# Patient Record
Sex: FEMALE | Race: WHITE | NOT HISPANIC OR LATINO | URBAN - METROPOLITAN AREA
[De-identification: names, ages, dates, MRNs, and addresses within clinical notes are randomized per-mention and may not be internally consistent; named-entity substitution may affect disease eponyms.]

---

## 2020-12-16 LAB
EXTERNAL HEPATITIS B SURFACE ANTIGEN: NEGATIVE
EXTERNAL RUBELLA QUALITATIVE: NORMAL
EXTERNAL SYPHILIS RPR SCREEN: NORMAL
HIV1 P24 AG SERPL QL IA: NEGATIVE

## 2021-03-30 ENCOUNTER — IMMUNIZATION (OUTPATIENT)
Dept: VACCINE CLINIC | Facility: HOSPITAL | Age: 31
End: 2021-03-30

## 2021-03-30 PROCEDURE — 0001A: CPT | Performed by: INTERNAL MEDICINE

## 2021-03-30 PROCEDURE — 91300 HC SARSCOV02 VAC 30MCG/0.3ML IM: CPT | Performed by: INTERNAL MEDICINE

## 2021-04-20 ENCOUNTER — IMMUNIZATION (OUTPATIENT)
Dept: VACCINE CLINIC | Facility: HOSPITAL | Age: 31
End: 2021-04-20

## 2021-04-20 PROCEDURE — 0002A: CPT | Performed by: INTERNAL MEDICINE

## 2021-04-20 PROCEDURE — 91300 HC SARSCOV02 VAC 30MCG/0.3ML IM: CPT | Performed by: INTERNAL MEDICINE

## 2021-06-22 LAB — EXTERNAL GROUP B STREP ANTIGEN: NORMAL

## 2021-07-08 ENCOUNTER — HOSPITAL ENCOUNTER (INPATIENT)
Facility: HOSPITAL | Age: 31
LOS: 2 days | Discharge: HOME | End: 2021-07-10
Attending: OBSTETRICS & GYNECOLOGY | Admitting: OBSTETRICS & GYNECOLOGY

## 2021-07-08 PROBLEM — Z34.90 PREGNANCY: Status: RESOLVED | Noted: 2021-07-08 | Resolved: 2021-07-08

## 2021-07-08 PROBLEM — Z34.90 PREGNANCY: Status: ACTIVE | Noted: 2021-07-08

## 2021-07-08 LAB
ABO GROUP BLD: NORMAL
BASOPHILS # BLD AUTO: 0.03 10*3/MM3 (ref 0–0.2)
BASOPHILS NFR BLD AUTO: 0.2 % (ref 0–1.5)
BLD GP AB SCN SERPL QL: NEGATIVE
DEPRECATED RDW RBC AUTO: 41.3 FL (ref 37–54)
EOSINOPHIL # BLD AUTO: 0.01 10*3/MM3 (ref 0–0.4)
EOSINOPHIL NFR BLD AUTO: 0.1 % (ref 0.3–6.2)
ERYTHROCYTE [DISTWIDTH] IN BLOOD BY AUTOMATED COUNT: 13.2 % (ref 12.3–15.4)
HCT VFR BLD AUTO: 39.2 % (ref 34–46.6)
HGB BLD-MCNC: 13.4 G/DL (ref 12–15.9)
IMM GRANULOCYTES # BLD AUTO: 0.09 10*3/MM3 (ref 0–0.05)
IMM GRANULOCYTES NFR BLD AUTO: 0.5 % (ref 0–0.5)
LYMPHOCYTES # BLD AUTO: 0.86 10*3/MM3 (ref 0.7–3.1)
LYMPHOCYTES NFR BLD AUTO: 5 % (ref 19.6–45.3)
MCH RBC QN AUTO: 29.2 PG (ref 26.6–33)
MCHC RBC AUTO-ENTMCNC: 34.2 G/DL (ref 31.5–35.7)
MCV RBC AUTO: 85.4 FL (ref 79–97)
MONOCYTES # BLD AUTO: 0.68 10*3/MM3 (ref 0.1–0.9)
MONOCYTES NFR BLD AUTO: 3.9 % (ref 5–12)
NEUTROPHILS NFR BLD AUTO: 15.59 10*3/MM3 (ref 1.7–7)
NEUTROPHILS NFR BLD AUTO: 90.3 % (ref 42.7–76)
NRBC BLD AUTO-RTO: 0 /100 WBC (ref 0–0.2)
PLATELET # BLD AUTO: 157 10*3/MM3 (ref 140–450)
PMV BLD AUTO: 11 FL (ref 6–12)
RBC # BLD AUTO: 4.59 10*6/MM3 (ref 3.77–5.28)
RH BLD: POSITIVE
SARS-COV-2 RNA PNL SPEC NAA+PROBE: NOT DETECTED
T&S EXPIRATION DATE: NORMAL
WBC # BLD AUTO: 17.26 10*3/MM3 (ref 3.4–10.8)

## 2021-07-08 PROCEDURE — 86901 BLOOD TYPING SEROLOGIC RH(D): CPT | Performed by: OBSTETRICS & GYNECOLOGY

## 2021-07-08 PROCEDURE — 86900 BLOOD TYPING SEROLOGIC ABO: CPT | Performed by: OBSTETRICS & GYNECOLOGY

## 2021-07-08 PROCEDURE — 86850 RBC ANTIBODY SCREEN: CPT | Performed by: OBSTETRICS & GYNECOLOGY

## 2021-07-08 PROCEDURE — 85025 COMPLETE CBC W/AUTO DIFF WBC: CPT | Performed by: OBSTETRICS & GYNECOLOGY

## 2021-07-08 PROCEDURE — 0KQM0ZZ REPAIR PERINEUM MUSCLE, OPEN APPROACH: ICD-10-PCS | Performed by: OBSTETRICS & GYNECOLOGY

## 2021-07-08 PROCEDURE — 87635 SARS-COV-2 COVID-19 AMP PRB: CPT | Performed by: OBSTETRICS & GYNECOLOGY

## 2021-07-08 RX ORDER — BISACODYL 10 MG
10 SUPPOSITORY, RECTAL RECTAL DAILY PRN
Status: DISCONTINUED | OUTPATIENT
Start: 2021-07-09 | End: 2021-07-10 | Stop reason: HOSPADM

## 2021-07-08 RX ORDER — OXYTOCIN-SODIUM CHLORIDE 0.9% IV SOLN 30 UNIT/500ML 30-0.9/5 UT/ML-%
250 SOLUTION INTRAVENOUS CONTINUOUS PRN
Status: ACTIVE | OUTPATIENT
Start: 2021-07-08 | End: 2021-07-08

## 2021-07-08 RX ORDER — MISOPROSTOL 200 UG/1
600 TABLET ORAL ONCE AS NEEDED
Status: DISCONTINUED | OUTPATIENT
Start: 2021-07-08 | End: 2021-07-10 | Stop reason: HOSPADM

## 2021-07-08 RX ORDER — MAGNESIUM CARB/ALUMINUM HYDROX 105-160MG
30 TABLET,CHEWABLE ORAL ONCE
Status: COMPLETED | OUTPATIENT
Start: 2021-07-08 | End: 2021-07-08

## 2021-07-08 RX ORDER — SODIUM CHLORIDE, SODIUM LACTATE, POTASSIUM CHLORIDE, CALCIUM CHLORIDE 600; 310; 30; 20 MG/100ML; MG/100ML; MG/100ML; MG/100ML
125 INJECTION, SOLUTION INTRAVENOUS CONTINUOUS
Status: DISCONTINUED | OUTPATIENT
Start: 2021-07-08 | End: 2021-07-08

## 2021-07-08 RX ORDER — CALCIUM CARBONATE 200(500)MG
2 TABLET,CHEWABLE ORAL 3 TIMES DAILY PRN
Status: DISCONTINUED | OUTPATIENT
Start: 2021-07-08 | End: 2021-07-10 | Stop reason: HOSPADM

## 2021-07-08 RX ORDER — ONDANSETRON 4 MG/1
4 TABLET, FILM COATED ORAL EVERY 8 HOURS PRN
Status: DISCONTINUED | OUTPATIENT
Start: 2021-07-08 | End: 2021-07-10 | Stop reason: HOSPADM

## 2021-07-08 RX ORDER — OXYTOCIN-SODIUM CHLORIDE 0.9% IV SOLN 30 UNIT/500ML 30-0.9/5 UT/ML-%
125 SOLUTION INTRAVENOUS CONTINUOUS PRN
Status: COMPLETED | OUTPATIENT
Start: 2021-07-08 | End: 2021-07-08

## 2021-07-08 RX ORDER — CARBOPROST TROMETHAMINE 250 UG/ML
250 INJECTION, SOLUTION INTRAMUSCULAR AS NEEDED
Status: DISCONTINUED | OUTPATIENT
Start: 2021-07-08 | End: 2021-07-08 | Stop reason: HOSPADM

## 2021-07-08 RX ORDER — PROMETHAZINE HYDROCHLORIDE 25 MG/1
25 TABLET ORAL EVERY 6 HOURS PRN
Status: DISCONTINUED | OUTPATIENT
Start: 2021-07-08 | End: 2021-07-10 | Stop reason: HOSPADM

## 2021-07-08 RX ORDER — HYDROCORTISONE 25 MG/G
1 CREAM TOPICAL AS NEEDED
Status: DISCONTINUED | OUTPATIENT
Start: 2021-07-08 | End: 2021-07-10 | Stop reason: HOSPADM

## 2021-07-08 RX ORDER — METHYLERGONOVINE MALEATE 0.2 MG/ML
200 INJECTION INTRAVENOUS ONCE AS NEEDED
Status: DISCONTINUED | OUTPATIENT
Start: 2021-07-08 | End: 2021-07-08 | Stop reason: HOSPADM

## 2021-07-08 RX ORDER — IBUPROFEN 800 MG/1
800 TABLET ORAL EVERY 8 HOURS PRN
Status: DISCONTINUED | OUTPATIENT
Start: 2021-07-08 | End: 2021-07-10 | Stop reason: HOSPADM

## 2021-07-08 RX ORDER — OXYCODONE AND ACETAMINOPHEN 10; 325 MG/1; MG/1
1 TABLET ORAL EVERY 4 HOURS PRN
Status: DISCONTINUED | OUTPATIENT
Start: 2021-07-08 | End: 2021-07-10 | Stop reason: HOSPADM

## 2021-07-08 RX ORDER — ASPIRIN 81 MG/1
81 TABLET, CHEWABLE ORAL DAILY
COMMUNITY
End: 2021-07-10 | Stop reason: HOSPADM

## 2021-07-08 RX ORDER — OXYCODONE HYDROCHLORIDE AND ACETAMINOPHEN 5; 325 MG/1; MG/1
1 TABLET ORAL EVERY 4 HOURS PRN
Status: DISCONTINUED | OUTPATIENT
Start: 2021-07-08 | End: 2021-07-10 | Stop reason: HOSPADM

## 2021-07-08 RX ORDER — LANOLIN
CREAM (ML) TOPICAL
Status: DISCONTINUED | OUTPATIENT
Start: 2021-07-08 | End: 2021-07-10 | Stop reason: HOSPADM

## 2021-07-08 RX ORDER — PHYTONADIONE 1 MG/.5ML
INJECTION, EMULSION INTRAMUSCULAR; INTRAVENOUS; SUBCUTANEOUS
Status: DISPENSED
Start: 2021-07-08 | End: 2021-07-09

## 2021-07-08 RX ORDER — PROMETHAZINE HYDROCHLORIDE 12.5 MG/1
12.5 SUPPOSITORY RECTAL EVERY 6 HOURS PRN
Status: DISCONTINUED | OUTPATIENT
Start: 2021-07-08 | End: 2021-07-10 | Stop reason: HOSPADM

## 2021-07-08 RX ORDER — ONDANSETRON 2 MG/ML
4 INJECTION INTRAMUSCULAR; INTRAVENOUS EVERY 6 HOURS PRN
Status: DISCONTINUED | OUTPATIENT
Start: 2021-07-08 | End: 2021-07-10 | Stop reason: HOSPADM

## 2021-07-08 RX ORDER — HYDROXYZINE 50 MG/1
50 TABLET, FILM COATED ORAL NIGHTLY PRN
Status: DISCONTINUED | OUTPATIENT
Start: 2021-07-08 | End: 2021-07-10 | Stop reason: HOSPADM

## 2021-07-08 RX ORDER — SODIUM CHLORIDE 0.9 % (FLUSH) 0.9 %
10 SYRINGE (ML) INJECTION AS NEEDED
Status: DISCONTINUED | OUTPATIENT
Start: 2021-07-08 | End: 2021-07-08

## 2021-07-08 RX ORDER — SODIUM CHLORIDE 0.9 % (FLUSH) 0.9 %
10 SYRINGE (ML) INJECTION EVERY 12 HOURS SCHEDULED
Status: DISCONTINUED | OUTPATIENT
Start: 2021-07-08 | End: 2021-07-08

## 2021-07-08 RX ORDER — DOCUSATE SODIUM 100 MG/1
100 CAPSULE, LIQUID FILLED ORAL 2 TIMES DAILY
Status: DISCONTINUED | OUTPATIENT
Start: 2021-07-08 | End: 2021-07-10 | Stop reason: HOSPADM

## 2021-07-08 RX ORDER — MISOPROSTOL 200 UG/1
800 TABLET ORAL AS NEEDED
Status: DISCONTINUED | OUTPATIENT
Start: 2021-07-08 | End: 2021-07-08 | Stop reason: HOSPADM

## 2021-07-08 RX ORDER — ERYTHROMYCIN 5 MG/G
OINTMENT OPHTHALMIC
Status: DISPENSED
Start: 2021-07-08 | End: 2021-07-09

## 2021-07-08 RX ORDER — PRENATAL VIT NO.126/IRON/FOLIC 28MG-0.8MG
1 TABLET ORAL DAILY
COMMUNITY

## 2021-07-08 RX ORDER — OXYTOCIN-SODIUM CHLORIDE 0.9% IV SOLN 30 UNIT/500ML 30-0.9/5 UT/ML-%
999 SOLUTION INTRAVENOUS ONCE
Status: COMPLETED | OUTPATIENT
Start: 2021-07-08 | End: 2021-07-08

## 2021-07-08 RX ADMIN — Medication: at 16:53

## 2021-07-08 RX ADMIN — SODIUM CHLORIDE, POTASSIUM CHLORIDE, SODIUM LACTATE AND CALCIUM CHLORIDE 999 ML/HR: 600; 310; 30; 20 INJECTION, SOLUTION INTRAVENOUS at 13:15

## 2021-07-08 RX ADMIN — IBUPROFEN 800 MG: 800 TABLET, FILM COATED ORAL at 16:53

## 2021-07-08 RX ADMIN — OXYTOCIN 125 ML/HR: 10 INJECTION INTRAVENOUS at 13:58

## 2021-07-08 RX ADMIN — MINERAL OIL 30 ML: 1000 SOLUTION ORAL at 12:30

## 2021-07-08 RX ADMIN — OXYTOCIN 999 ML/HR: 10 INJECTION INTRAVENOUS at 12:37

## 2021-07-08 NOTE — L&D DELIVERY NOTE
"[facility]  Vaginal Delivery Note    Patient Name: Luz Maria Ospina  :  1990  MRN:  [id number]      Delivery     Delivery: Vaginal, Spontaneous     YOB: 2020    Time of Birth: 12:33 PM      Anesthesia: None     Delivering clinician: Kellen Talbot MD       Infant    Findings: Viable female  infant    Infant observations: Weight: 3245 g (7 lb 2.5 oz)   Observations/Comments:  scale 4      Apgars: 8  @ 1 minute /    9  @ 5 minutes     Placenta, Cord, and Fluid    Placenta delivered  Spontaneous   at  2021 12:37 PM     Cord: 3 vessels  present.   Cord blood obtained: Yes    Cord gases obtained:  No      Repair    Episiotomy: No   Lacerations: Second midline   Estimated Blood Loss: 400  mls.          Delivery narrative: The patient is a 31 y.o.  at 39w3d.  Presented in active labor after SROM about midnight.  Was 6-7 on admission. Progressed normally to C/C/+2.  Pushed quickly to bring baby to perineum.  Fetal status reassuring throughout.  of viable female infant  @ 12:33 PM  over an intact perineum. ASDE. 3245 g (7 lb 2.5 oz) .  Placenta delivered spontaneously, intact with 3 vessel cord. Cervix and rectum intact. Lidocaine placed for repair.  Second degree laceration repaired in usual fashion with 3-0 monocryl suture. Mother and baby recovering good condition.       Kellen Talbot MD  21  15:00 EDT  Note written at time of delivery- will \"refresh\" later to include baby data.    "

## 2021-07-08 NOTE — LACTATION NOTE
Mom reports baby is latching well so far. Mom had questions regarding pumping at some point to store breast milk. Encouraged mom to call RN for assistance if needed tonight and LC will be back in morning. Mom has LC number. Mom has personal pump  Lactation Consult Note    Evaluation Completed: 2021 18:36 EDT  Patient Name: Luz Maria Ospina  :  1990  MRN:  [id number]     REFERRAL  INFORMATION:                                         DELIVERY HISTORY:        Skin to skin initiation date/time: 2021  12:35 PM   Skin to skin end date/time:           MATERNAL ASSESSMENT:                               INFANT ASSESSMENT:  Information for the patient's :  Brendan Ospina [[id number]]   No past medical history on file.                                                                                                     MATERNAL INFANT FEEDING:                                                                       EQUIPMENT TYPE:                                 BREAST PUMPING:          LACTATION REFERRALS:

## 2021-07-08 NOTE — H&P
[facility]  Obstetric History and Physical    Chief Complaint   Patient presents with   • Laboring     SROM @ 0300. 1 cm in office 21,, GBS- oer ot. Reports good fetal movement       Subjective     Patient is a 31 y.o. female  currently at 39w3d, who presents with ruptured membranes and active labor.    Her prenatal care is benign.  Her previous obstetric/gynecological history is noted for is non-contributory.    The following portions of the patients history were reviewed and updated as appropriate: current medications, allergies, past medical history, past surgical history, past family history, past social history and problem list .       Prenatal Information:  Prenatal Results    The patient does not have a working TAYLOR. Some results will not display without a working TAYLOR.   POC Urine Glucose/Protein     Test Value Reference Range Date Time    Urine Glucose        Urine Protein              Initial Prenatal Labs     Test Value Reference Range Date Time    Hemoglobin        Hematocrit        Platelets        Rubella IgG        Hepatitis B SAg        Hepatitis C Ab        RPR        ABO        Rh        Antibody Screen        HIV        Urine Culture        Gonorrhea        Chlamydia        TSH              2nd and 3rd Trimester     Test Value Reference Range Date Time    Hemoglobin (repeated)        Hematocrit (repeated)        GCT        Antibody Screen (repeated)        GTT Fasting        GTT 1 Hr        GTT 2 Hr        GTT 3 Hr        Group B Strep              Drug Screening     Test Value Reference Range Date Time    Amphetamine Screen        Barbiturate Screen        Benzodiazepine Screen        Methadone Screen        Phencyclidine Screen        Opiates Screen        THC Screen        Cocaine Screen        Propoxyphene Screen        Buprenorphine Screen        Methamphetamine Screen        Oxycodone Screen        Tricyclic Antidepressants Screen              Other (Risk screening)     Test  Value Reference Range Date Time    Varicella IgG        Parvovirus IgG        CMV IgG        Cystic Fibrosis        Hemoglobin electrophoresis        NIPT        MSAFP-4        AFP (for NTD only)              Legend    ^: Historical                           Past OB History:     OB History    Para Term  AB Living   1 1 1 0 0 1   SAB TAB Ectopic Molar Multiple Live Births   0 0 0 0 0 1      # Outcome Date GA Lbr Alberto/2nd Weight Sex Delivery Anes PTL Lv   1 Term 21 39w3d 09:24 / 00:09 3245 g (7 lb 2.5 oz) F Vag-Spont None N CANDELARIA      Birth Comments: scale 4      Name: ANTONIO STEVENS      Apgar1: 8  Apgar5: 9       Past Medical History: History reviewed. No pertinent past medical history.   Past Surgical History Past Surgical History:   Procedure Laterality Date   • WISDOM TOOTH EXTRACTION        Family History: Family History   Problem Relation Age of Onset   • Diabetes Maternal Grandfather       Social History:  reports that she has never smoked. She has never used smokeless tobacco.   has no history on file for alcohol use.   has no history on file for drug use.        General ROS: Pertinent items are noted in HPI    Objective       Vital Signs Range for the last 24 hours  Temperature: Temp:  [97.8 °F (36.6 °C)-98.9 °F (37.2 °C)] 98 °F (36.7 °C)   Temp Source: Temp src: Oral   BP: BP: (117-131)/(65-89) 125/80   Pulse: Heart Rate:  [] 74   Respirations: Resp:  [16-20] 16   SPO2: SpO2:  [100 %] 100 %   O2 Amount (l/min):     O2 Devices     Weight: Weight:  [68 kg (150 lb)] 68 kg (150 lb)     Physical Examination: General appearance - alert, well appearing, and in no distress  Chest - clear to auscultation, no wheezes, rales or rhonchi, symmetric air entry  Heart - normal rate, regular rhythm, normal S1, S2, no murmurs, rubs, clicks or gallops  Abdomen - soft, nontender, nondistended, no masses or organomegaly  Pelvic - normal external genitalia, vulva, vagina, cervix, uterus and  adnexa    Presentation: cephalic   Cervix: Exam by: Method: sterile exam per RN   Dilation: Cervical Dilation (cm): 10   Effacement: Cervical Effacement: 100%   Station:         Fetal Heart Rate Assessment   Method: Fetal HR Assessment Method: external   Beats/min: Fetal HR (beats/min): 145   Baseline: Fetal Heart Baseline Rate: normal range   Variability: Fetal HR Variability: moderate (amplitude range 6 to 25 bpm)   Accels: Fetal HR Accelerations: greater than/equal to 15 bpm, lasting at least 15 seconds   Decels: Fetal HR Decelerations: late   Tracing Category:       Uterine Assessment   Method: Method: palpation, per patient report, external tocotransducer   Frequency (min): Contraction Frequency (Minutes): 2-4   Ctx Count in 10 min:     Duration:     Intensity: Contraction Intensity: moderate by palpation   Intensity by IUPC:     Resting Tone: Uterine Resting Tone: soft by palpation   Resting Tone by IUPC:     Gratiot Units:       *    Assessment/Plan       * No active hospital problems. *        Assessment:  1.  Intrauterine pregnancy at Unknown gestation with reassuring fetal status.    2.  Active labor.  3.  Obstetrical history significant for is non-contributory.  4.  GBS status: Negative    Plan:  1. Admit.  Pt has considered unmedicated birth but may reconsider.  2. Plan of care has been reviewed with patient and .  3.  Risks, benefits of treatment plan have been discussed.  4.  All questions have been answered.        Kellen Talbot MD  7/8/2021  15:00 EDT

## 2021-07-09 LAB
BASOPHILS # BLD AUTO: 0.04 10*3/MM3 (ref 0–0.2)
BASOPHILS NFR BLD AUTO: 0.2 % (ref 0–1.5)
DEPRECATED RDW RBC AUTO: 39.6 FL (ref 37–54)
EOSINOPHIL # BLD AUTO: 0.05 10*3/MM3 (ref 0–0.4)
EOSINOPHIL NFR BLD AUTO: 0.2 % (ref 0.3–6.2)
ERYTHROCYTE [DISTWIDTH] IN BLOOD BY AUTOMATED COUNT: 13 % (ref 12.3–15.4)
HCT VFR BLD AUTO: 31.1 % (ref 34–46.6)
HGB BLD-MCNC: 10.6 G/DL (ref 12–15.9)
IMM GRANULOCYTES # BLD AUTO: 0.2 10*3/MM3 (ref 0–0.05)
IMM GRANULOCYTES NFR BLD AUTO: 1 % (ref 0–0.5)
LYMPHOCYTES # BLD AUTO: 2.43 10*3/MM3 (ref 0.7–3.1)
LYMPHOCYTES NFR BLD AUTO: 11.9 % (ref 19.6–45.3)
MCH RBC QN AUTO: 28.7 PG (ref 26.6–33)
MCHC RBC AUTO-ENTMCNC: 34.1 G/DL (ref 31.5–35.7)
MCV RBC AUTO: 84.3 FL (ref 79–97)
MONOCYTES # BLD AUTO: 1.38 10*3/MM3 (ref 0.1–0.9)
MONOCYTES NFR BLD AUTO: 6.8 % (ref 5–12)
NEUTROPHILS NFR BLD AUTO: 16.25 10*3/MM3 (ref 1.7–7)
NEUTROPHILS NFR BLD AUTO: 79.9 % (ref 42.7–76)
NRBC BLD AUTO-RTO: 0 /100 WBC (ref 0–0.2)
PLATELET # BLD AUTO: 177 10*3/MM3 (ref 140–450)
PMV BLD AUTO: 11.3 FL (ref 6–12)
RBC # BLD AUTO: 3.69 10*6/MM3 (ref 3.77–5.28)
WBC # BLD AUTO: 20.35 10*3/MM3 (ref 3.4–10.8)

## 2021-07-09 PROCEDURE — 85025 COMPLETE CBC W/AUTO DIFF WBC: CPT | Performed by: OBSTETRICS & GYNECOLOGY

## 2021-07-09 RX ADMIN — DOCUSATE SODIUM 100 MG: 100 CAPSULE, LIQUID FILLED ORAL at 08:28

## 2021-07-09 NOTE — PLAN OF CARE
Goal Outcome Evaluation:         VSS, breastfeeding, pain well controlled, fundal assessments wnl, bleeding scant to light

## 2021-07-09 NOTE — LACTATION NOTE
Mom reports baby is nursing some and she supplements with a little formula per syringe per parents choice. Mom reports she tried to pump last night but didn't get anything out. Educated on supply and demand. Encouraged mom to BF on demand before giving formula supplement. Mom declines assistance at this time. Encouraged to call LC if needing assistance    Lactation Consult Note    Evaluation Completed: 2021 09:01 EDT  Patient Name: Luz Maria Ospina  :  1990  MRN:  [id number]     REFERRAL  INFORMATION:                                         DELIVERY HISTORY:        Skin to skin initiation date/time: 2021  12:35 PM   Skin to skin end date/time:           MATERNAL ASSESSMENT:                               INFANT ASSESSMENT:  Information for the patient's :  Lolita Ospinarajesh [[id number]]   No past medical history on file.                                                                                                     MATERNAL INFANT FEEDING:                                                                       EQUIPMENT TYPE:                                 BREAST PUMPING:          LACTATION REFERRALS:

## 2021-07-09 NOTE — PROGRESS NOTES
[facility]  Vaginal Delivery Progress Note    Patient Name: Luz Maria Ospina  :  1990  MRN:  [id number]      Subjective   Postpartum Day 1: Vaginal Delivery of a female infant.     The patient feels well without complaints.  Her pain is well controlled.  Reports normal lochia.     The patient plans to breastfeed.    Objective     Vital Signs Range for the last 24 hours  Temperature: Temp:  [97.7 °F (36.5 °C)-98.9 °F (37.2 °C)] 97.9 °F (36.6 °C)       BP: BP: (102-131)/(65-89) 102/66   Pulse: Heart Rate:  [] 103   Respirations: Resp:  [16-20] 16                       Physical Exam:  General: Awake and alert  Abdomen: Fundus: firm, non tender, 1 below umbilicus  Extremities:  trace edema, NT     Labs:     Results from last 7 days   Lab Units 21  0545 21  1009   WBC 10*3/mm3 20.35* 17.26*   HEMOGLOBIN g/dL 10.6* 13.4   HEMATOCRIT % 31.1* 39.2   PLATELETS 10*3/mm3 177 157       Prenatal labs results reviewed:  Yes   Rubella:  Immune  Rh Status:    RH type   Date Value Ref Range Status   2021 Positive  Final         Assessment/Plan  : 1. PPD1 S/P  - Doing well, continue usual cares.     Elev wbc-- not sig jump from admission.  No evidence infection.  Plan to repeat in am.        * No active hospital problems. *          Beata Chang, KALIN  2021  09:19 EDT    Patient seen and examined. Agree with above assessment.   Bee Noble MD  2021  12:07 EDT

## 2021-07-10 VITALS
WEIGHT: 150 LBS | HEIGHT: 65 IN | DIASTOLIC BLOOD PRESSURE: 81 MMHG | SYSTOLIC BLOOD PRESSURE: 112 MMHG | BODY MASS INDEX: 24.99 KG/M2 | RESPIRATION RATE: 16 BRPM | HEART RATE: 109 BPM | TEMPERATURE: 98.2 F | OXYGEN SATURATION: 98 %

## 2021-07-10 LAB
DEPRECATED RDW RBC AUTO: 42.8 FL (ref 37–54)
ERYTHROCYTE [DISTWIDTH] IN BLOOD BY AUTOMATED COUNT: 13.4 % (ref 12.3–15.4)
HCT VFR BLD AUTO: 29.5 % (ref 34–46.6)
HGB BLD-MCNC: 9.9 G/DL (ref 12–15.9)
MCH RBC QN AUTO: 29.2 PG (ref 26.6–33)
MCHC RBC AUTO-ENTMCNC: 33.6 G/DL (ref 31.5–35.7)
MCV RBC AUTO: 87 FL (ref 79–97)
PLATELET # BLD AUTO: 149 10*3/MM3 (ref 140–450)
PMV BLD AUTO: 10.4 FL (ref 6–12)
RBC # BLD AUTO: 3.39 10*6/MM3 (ref 3.77–5.28)
WBC # BLD AUTO: 11.82 10*3/MM3 (ref 3.4–10.8)

## 2021-07-10 PROCEDURE — 85027 COMPLETE CBC AUTOMATED: CPT | Performed by: NURSE PRACTITIONER

## 2021-07-10 RX ADMIN — DOCUSATE SODIUM 100 MG: 100 CAPSULE, LIQUID FILLED ORAL at 09:39

## 2021-07-10 NOTE — LACTATION NOTE
This note was copied from a baby's chart.  Parents called for latch assist, having trouble waking infant for feeding. Advised mother to hand express, drops given to infant, she then begins rooting. Mother then able to achieve comfortable latch on right side. Encouraged to keep infant awake during feeding trying for at least 15 min per side. Encouraged to call as needed.

## 2021-07-10 NOTE — LACTATION NOTE
P1. Patient is anticipating discharge today. Baby girl latches well per mom but falls asleep easily. Baby is moderately jaundiced and LC educated parents on what that can mean for feedings and how best to help baby eliminate the jaundice from her body through stool . Patient has a personal electric breast pump at home and insurance pumping was recommended until milk is fully in with parents feeding back all EBM. Reviewed provided booklet on changes in infant output and stool color and safe storage of expressed breast milk.  Stressed importance of feeding baby q2-3 hours and maternal hydration. Strongly enc follow-up with LC in \[facility]\"" and card for LC contact info given.

## 2021-07-10 NOTE — LACTATION NOTE
This note was copied from a baby's chart.  Mother reports that she is concerned as infant has been sleepy and she is having trouble waking infant to latch. Discussed ways to rouse infant for feedings. Demonstrated hand expression and several drops obtained and given to infant. Then assisted mother with cross cradle position on left side, football on right, infant able to achieve deep latch with strong nutritive suckle on both sides. Mother denies pain, nipples round/intact/everted upon release. Discussed with FOB and mother ways to keep infant stimulated during feeding. Discussed potential for clusterfeeding tonight and encouraged unrestricted access to the breast.

## 2021-07-10 NOTE — PLAN OF CARE
Goal Outcome Evaluation:  Plan of Care Reviewed With: patient, spouse        Progress: improving  Outcome Summary: stable. pain controlled with ibuprofen.Breast feeding and supplement. questions answered. no c/o or concerns voiced at present.

## 2021-07-10 NOTE — DISCHARGE SUMMARY
Date of Discharge:  7/10/2021    Discharge Diagnosis: term pregnancy delivered    Presenting Problem/History of Present Illness  Pregnancy [Z34.90]       Hospital Course  Patient is a 31 y.o. female presented with active labor and delivered. Has had a good recovery.  Ready for DC.      Procedures Performed  Vaginal delivery       Consults:   Consults     No orders found from 2021 to 2021.          Condition on Discharge:   Subjective   Postpartum Day 2 Vaginal Delivery.    The patient feels well without complaints.    Vital Signs  Temp:  [98.2 °F (36.8 °C)-98.6 °F (37 °C)] 98.2 °F (36.8 °C)  Heart Rate:  [] 109  Resp:  [16] 16  BP: ()/(67-81) 112/81    Physical Exam:   General: Awake and alert   Abdomen: Fundus: firm, non tender    Extremities:  Calves NT bilaterally    Assessment/Plan     PPD2  S/P  -   Stable for discharge. Instructions reviewed      Discharge Disposition  Home or Self Carehome    Discharge Medications     Discharge Medications      Continue These Medications      Instructions Start Date   prenatal (CLASSIC) vitamin  tablet  Generic drug: prenatal vitamin   1 tablet, Oral, Daily         Stop These Medications    aspirin 81 MG chewable tablet              The patient has been prescribed a controlled substance.  She has been counseled on the risks associated with using the medication.   The addictive potential of this medication and alternatives were discussed carefully with this patient and she demonstrated understanding.  A ARMINDA report has been obtained and reviewed.       Activity at Discharge: restrictions reviewed    Follow-up Appointments  6 weeks      Test Results Pending at Discharge  none     Kellen Talbot MD  07/10/21  09:22 EDT

## 2021-09-29 ENCOUNTER — HOSPITAL ENCOUNTER (OUTPATIENT)
Dept: LACTATION | Facility: HOSPITAL | Age: 31
Discharge: HOME | End: 2021-09-29

## 2021-09-29 NOTE — LACTATION NOTE
Lactation Consult Note  Mom here with baby girl, 2.5 months in 4th percentile on weight chart at pediatrician's office. She breast fed bilaterally, transferred 34cc, crying and showing feeding cues afterwards. Supplemented with 2oz formula. Mom had to go back to school when baby was 3 weeks old and only gets to pump one time when she is away from baby. During that time baby gets 4 4oz bottles. Encouraged Mom to pump as much as possible after every feeding when she is home with baby and in meantime to supplement baby with 2oz formula after nursing. Called her pediatrician in [place], Dr. Peng AVILEZ at [facility], spoke with Lindsey and relied the plan. Baby to f/u with pediatrician on Oct.8 per Mom.  Encouraged Mom to return here for f/u or call for advise or f/u with IBCLC in [place], [place].  Evaluation Completed: 2021 14:05 EDT  Patient Name: Luz Maria Ospina  :  1990  MRN:  [id number]     REFERRAL  INFORMATION:                          Date of Referral: 21   Person Making Referral: patient     Infant Reason for Referral: other (see comments) (4th percentile weight)      MATERNAL ASSESSMENT:  Breast Size Issue: none (21)  Breast Shape: Bilateral:, round (21)  Breast Density: soft (21)  Areola: elastic (21)  Nipples: everted (21)                MATERNAL INFANT FEEDING:     Maternal Emotional State: independent (21)  Infant Positioning: cradle (21)   Signs of Milk Transfer: suck/swallow ratio (21)  Pain with Feeding: no (21)           Milk Ejection Reflex: present (21)           Latch Assistance: none needed (21)                           Feeding Readiness Cues: eager (21)  Satiety Cues: other (see comments) (crying) (21)     Effective Latch During Feeding: yes (21)        Prefeeding Weight (gm): 4160 g (146.7 oz) (21)  Postfeeding Weight  (gm): 4194 g (147.9 oz) (21)  Weight Gain/Loss (gm) : 34 g (1.2 oz) (21)      Latch: 2-->grasps breast, tongue down, lips flanged, rhythmic sucking (21)  Audible Swallowin-->spontaneous and intermittent (24 hrs old) (21)  Type of Nipple: 2-->everted (after stimulation) (21)  Comfort (Breast/Nipple): 2-->soft/nontender (21)  Hold (Positioning): 2-->no assist from staff, mother able to position/hold infant (21)  Latch Score: 10 (21)      EQUIPMENT TYPE:  Breast Pump Type: double electric, personal (21)                              BREAST PUMPING:  Breast Pumping Interventions: post-feed pumping encouraged (21)       LACTATION REFERRALS:  Lactation Referrals: outpatient lactation program (21)